# Patient Record
Sex: FEMALE | Race: WHITE
[De-identification: names, ages, dates, MRNs, and addresses within clinical notes are randomized per-mention and may not be internally consistent; named-entity substitution may affect disease eponyms.]

---

## 2019-10-28 ENCOUNTER — HOSPITAL ENCOUNTER (OUTPATIENT)
Dept: HOSPITAL 92 - SCSRAD | Age: 54
Discharge: HOME | End: 2019-10-28
Attending: FAMILY MEDICINE
Payer: COMMERCIAL

## 2019-10-28 DIAGNOSIS — R07.9: Primary | ICD-10-CM

## 2019-10-28 PROCEDURE — 36415 COLL VENOUS BLD VENIPUNCTURE: CPT

## 2019-10-28 PROCEDURE — 84443 ASSAY THYROID STIM HORMONE: CPT

## 2019-10-28 PROCEDURE — 82550 ASSAY OF CK (CPK): CPT

## 2019-10-28 PROCEDURE — 83525 ASSAY OF INSULIN: CPT

## 2019-10-28 PROCEDURE — 84480 ASSAY TRIIODOTHYRONINE (T3): CPT

## 2019-10-28 PROCEDURE — 80053 COMPREHEN METABOLIC PANEL: CPT

## 2019-10-28 PROCEDURE — 80061 LIPID PANEL: CPT

## 2019-10-28 PROCEDURE — 84484 ASSAY OF TROPONIN QUANT: CPT

## 2019-10-28 PROCEDURE — 71046 X-RAY EXAM CHEST 2 VIEWS: CPT

## 2019-10-28 PROCEDURE — 84436 ASSAY OF TOTAL THYROXINE: CPT

## 2019-10-28 PROCEDURE — 82553 CREATINE MB FRACTION: CPT

## 2019-10-28 PROCEDURE — 83036 HEMOGLOBIN GLYCOSYLATED A1C: CPT

## 2019-10-28 PROCEDURE — 85025 COMPLETE CBC W/AUTO DIFF WBC: CPT

## 2019-10-28 NOTE — RAD
EXAM:

Chest Two Views



10/28/2019 5:41 PM



HISTORY:

Shortness of breath



COMPARISON:

August 30, 2012



FINDINGS:

Heart: Normal in size and contour.



Pulmonary vessels: Normal.



Costophrenic angles: Clear.



Lungs: No acute airspace consolidation.



Pneumothorax: None.



Osseous structures:Intact.



Additional findings:   None.



IMPRESSION:

No significant acute intrathoracic disease.



Reported By: Richy Quigley 

Electronically Signed:  10/28/2019 5:41 PM

## 2019-11-06 ENCOUNTER — HOSPITAL ENCOUNTER (OUTPATIENT)
Dept: HOSPITAL 92 - RAD | Age: 54
Discharge: HOME | End: 2019-11-06
Attending: SURGERY
Payer: COMMERCIAL

## 2019-11-06 DIAGNOSIS — K21.9: Primary | ICD-10-CM

## 2019-11-06 PROCEDURE — 74220 X-RAY XM ESOPHAGUS 1CNTRST: CPT

## 2019-11-06 NOTE — RAD
XR Barium Swallow Esophagus



History: Dysphagia. K 21.9 gastroesophageal reflux disease



Comparison: None





Findings: Patient was brought to the fluoroscopy suite. All questions were answered.



 radiograph of the chest was normal.



Patient was initially given a half dose of gas-forming crystals. Next thick liquid barium was given. 
Primary and secondary peristalsis was normal. No extrinsic mass effect. No stricture. No

diverticulum.



Next a 13 mm tablet wasn't measured which passed with ease.



Next the patient was put in the PATTON position and given thin liquid barium to continuously drink. Agai
n primary and secondary peristalsis was normal. No significant hernia. No reflux.



Impression: Normal esophagram.



Fluoroscopy time: 0.9 minutes



Reported By: Sunday Ko 

Electronically Signed:  11/6/2019 9:51 AM